# Patient Record
Sex: MALE | Race: WHITE | NOT HISPANIC OR LATINO | Employment: OTHER | URBAN - NONMETROPOLITAN AREA
[De-identification: names, ages, dates, MRNs, and addresses within clinical notes are randomized per-mention and may not be internally consistent; named-entity substitution may affect disease eponyms.]

---

## 2017-05-25 ENCOUNTER — HOSPITAL ENCOUNTER (EMERGENCY)
Facility: HOSPITAL | Age: 47
Discharge: HOME | End: 2017-05-25
Attending: EMERGENCY MEDICINE | Admitting: EMERGENCY MEDICINE

## 2017-05-25 ENCOUNTER — APPOINTMENT (OUTPATIENT)
Dept: GENERAL RADIOLOGY | Facility: HOSPITAL | Age: 47
End: 2017-05-25

## 2017-05-25 VITALS
BODY MASS INDEX: 23.3 KG/M2 | SYSTOLIC BLOOD PRESSURE: 115 MMHG | DIASTOLIC BLOOD PRESSURE: 74 MMHG | OXYGEN SATURATION: 97 % | TEMPERATURE: 98 F | RESPIRATION RATE: 18 BRPM | HEART RATE: 81 BPM | HEIGHT: 66 IN | WEIGHT: 145 LBS

## 2017-05-25 DIAGNOSIS — S43.52XA ACROMIOCLAVICULAR SPRAIN, LEFT, INITIAL ENCOUNTER: Primary | ICD-10-CM

## 2017-05-25 PROCEDURE — 99282 EMERGENCY DEPT VISIT SF MDM: CPT

## 2017-05-25 PROCEDURE — 73030 X-RAY EXAM OF SHOULDER: CPT

## 2017-06-07 ENCOUNTER — OFFICE VISIT (OUTPATIENT)
Dept: ORTHOPEDIC SURGERY | Facility: CLINIC | Age: 47
End: 2017-06-07

## 2017-06-07 VITALS — BODY MASS INDEX: 22.18 KG/M2 | WEIGHT: 138 LBS | HEIGHT: 66 IN

## 2017-06-07 DIAGNOSIS — M25.512 ACUTE PAIN OF LEFT SHOULDER: Primary | ICD-10-CM

## 2017-06-07 DIAGNOSIS — M75.02 ADHESIVE CAPSULITIS OF LEFT SHOULDER: ICD-10-CM

## 2017-06-07 PROCEDURE — 99203 OFFICE O/P NEW LOW 30 MIN: CPT | Performed by: ORTHOPAEDIC SURGERY

## 2017-06-07 RX ORDER — ACETAMINOPHEN 500 MG
500 TABLET ORAL EVERY 6 HOURS PRN
COMMUNITY
End: 2020-07-18

## 2017-06-07 RX ORDER — MELOXICAM 15 MG/1
15 TABLET ORAL DAILY
Qty: 30 TABLET | Refills: 5
Start: 2017-06-07 | End: 2020-07-18

## 2017-06-07 NOTE — PROGRESS NOTES
"Subjective   Jeovany Jacob is a 46 y.o. male. Acute left shoulder pain.     History of Present Illness Patient was seen in the ED for left shoulder pain. Patient states that it started off being sore and then has got worse. The pain is now moderate to severe. He states that he has had  Similar problems with left shoulder  About 10 years ago, and received a steroid injection  And it helped.       The following portions of the patient's history were reviewed and updated as appropriate:   He  has no past medical history on file.  He  does not have any pertinent problems on file.  He  has a past surgical history that includes Appendectomy.  His family history includes No Known Problems in his father and mother.  He  reports that he has been smoking Cigarettes.  He started smoking about 10 years ago. He has been smoking about 10.00 packs per day. He does not have any smokeless tobacco history on file. His alcohol and drug histories are not on file.  Current Outpatient Prescriptions   Medication Sig Dispense Refill   • acetaminophen (TYLENOL) 500 MG tablet Take 500 mg by mouth Every 6 (Six) Hours As Needed for Mild Pain (1-3).     • etodolac (LODINE) 300 MG capsule Take 1 capsule by mouth 3 (Three) Times a Day As Needed (pain). 30 capsule 0     No current facility-administered medications for this visit.      Current Outpatient Prescriptions on File Prior to Visit   Medication Sig   • etodolac (LODINE) 300 MG capsule Take 1 capsule by mouth 3 (Three) Times a Day As Needed (pain).     No current facility-administered medications on file prior to visit.      He has No Known Allergies..    Review of Systems  Ht 66\" (167.6 cm)  Wt 138 lb (62.6 kg)  BMI 22.27 kg/m2    Social History     Social History   • Marital status:      Spouse name: N/A   • Number of children: N/A   • Years of education: N/A     Occupational History   • Not on file.     Social History Main Topics   • Smoking status: Current Every Day Smoker "     Packs/day: 10.00     Types: Cigarettes     Start date: 6/7/2007   • Smokeless tobacco: Not on file   • Alcohol use Not on file   • Drug use: Not on file   • Sexual activity: Not on file     Other Topics Concern   • Not on file     Social History Narrative       HEENT: Normocephalic.  PERRLA.  TM's clear bilaterally.  Oropharynx: Clear.  Neck: Supple, with no adenopathy.  Chest: Equal bilateral expansion.  Clear to auscultation and percussion.  Heart: Regular sinus rhythm, S1 and S2 normal.  No murmurs or extra heart sounds heard.  Abdomen: Soft, nontender, and no organomegaly.  Neurological: cranial nerves II-XII normal Vascular: pulses are present  Dermatological: no rashes  or blemishes, or any abnormality of the skin.    REVIEW OF SYSTEMS:  Negative, other than presenting complaint.  HEENT: No headaches, diplopia, blurred vision, tinnitus, vertigo, epistaxis, hoarseness or sore throat.  Pulmonary: No cough, sputum, hemoptysis, dyspnea, wheezing, or chest pain.  Cardiac: No chest pain, palpitations, orthopnea, paroxysmal nocturnal dyspnea, shortness of breath, or pedal edema.  Gastrointestinal: No diarrhea, melena, or constipation.  Genitourinary: No dysuria, hematuria, nocturia, frequency, bladder or bowel incontinence.  Hematology: No history of any anemia, fatigue, fever, or chills or night sweats.  Dermatology: No rashes, pruritus, or increased pigmentation changes of the skin.   Objective   Physical Exam difficulty in internal rotation , only to posterior superior  Iliac spine  Flexion to 70 degrees external rotation to 45 degrees. Neuro intact.      Assessment/Plan adhesive capsulitis  Of left sholulder. Plan: PT  , meloxicam 15 mg daily with a meal.  See back in 6 weeks.   Jeovany was seen today for pain.    Diagnoses and all orders for this visit:    Acute pain of left shoulder    Adhesive capsulitis of left shoulder

## 2018-01-30 ENCOUNTER — APPOINTMENT (OUTPATIENT)
Dept: ULTRASOUND IMAGING | Facility: HOSPITAL | Age: 48
End: 2018-01-30

## 2018-01-30 ENCOUNTER — HOSPITAL ENCOUNTER (EMERGENCY)
Facility: HOSPITAL | Age: 48
Discharge: HOME | End: 2018-01-30
Attending: EMERGENCY MEDICINE | Admitting: EMERGENCY MEDICINE

## 2018-01-30 VITALS
SYSTOLIC BLOOD PRESSURE: 127 MMHG | OXYGEN SATURATION: 100 % | BODY MASS INDEX: 22.5 KG/M2 | HEIGHT: 66 IN | WEIGHT: 140 LBS | TEMPERATURE: 98.9 F | DIASTOLIC BLOOD PRESSURE: 76 MMHG | HEART RATE: 71 BPM | RESPIRATION RATE: 18 BRPM

## 2018-01-30 DIAGNOSIS — N45.1 EPIDIDYMITIS: Primary | ICD-10-CM

## 2018-01-30 LAB
BILIRUB UR QL STRIP: NEGATIVE
CLARITY UR: CLEAR
COLOR UR: YELLOW
GLUCOSE UR STRIP-MCNC: NEGATIVE MG/DL
HGB UR QL STRIP.AUTO: NEGATIVE
KETONES UR QL STRIP: NEGATIVE
LEUKOCYTE ESTERASE UR QL STRIP.AUTO: NEGATIVE
NITRITE UR QL STRIP: NEGATIVE
PH UR STRIP.AUTO: 6.5 [PH] (ref 5–9)
PROT UR QL STRIP: NEGATIVE
SP GR UR STRIP: 1.02 (ref 1–1.03)
UROBILINOGEN UR QL STRIP: NORMAL

## 2018-01-30 PROCEDURE — 76870 US EXAM SCROTUM: CPT

## 2018-01-30 PROCEDURE — 81003 URINALYSIS AUTO W/O SCOPE: CPT | Performed by: EMERGENCY MEDICINE

## 2018-01-30 PROCEDURE — 96372 THER/PROPH/DIAG INJ SC/IM: CPT

## 2018-01-30 PROCEDURE — 99283 EMERGENCY DEPT VISIT LOW MDM: CPT

## 2018-01-30 PROCEDURE — 93976 VASCULAR STUDY: CPT

## 2018-01-30 PROCEDURE — 25010000002 CEFTRIAXONE PER 250 MG: Performed by: EMERGENCY MEDICINE

## 2018-01-30 RX ORDER — IBUPROFEN 400 MG/1
400 TABLET ORAL ONCE
Status: COMPLETED | OUTPATIENT
Start: 2018-01-30 | End: 2018-01-30

## 2018-01-30 RX ORDER — DOXYCYCLINE 100 MG/1
100 CAPSULE ORAL EVERY 12 HOURS SCHEDULED
Qty: 20 CAPSULE | Refills: 0 | Status: SHIPPED | OUTPATIENT
Start: 2018-01-30 | End: 2020-07-18

## 2018-01-30 RX ORDER — OXYCODONE HYDROCHLORIDE AND ACETAMINOPHEN 5; 325 MG/1; MG/1
1 TABLET ORAL ONCE
Status: COMPLETED | OUTPATIENT
Start: 2018-01-30 | End: 2018-01-30

## 2018-01-30 RX ORDER — NAPROXEN 500 MG/1
500 TABLET ORAL 2 TIMES DAILY WITH MEALS
Qty: 30 TABLET | Refills: 0 | Status: SHIPPED | OUTPATIENT
Start: 2018-01-30 | End: 2020-07-18

## 2018-01-30 RX ADMIN — CEFTRIAXONE SODIUM 250 MG: 250 INJECTION, POWDER, FOR SOLUTION INTRAMUSCULAR; INTRAVENOUS at 20:35

## 2018-01-30 RX ADMIN — OXYCODONE HYDROCHLORIDE AND ACETAMINOPHEN 1 TABLET: 5; 325 TABLET ORAL at 19:26

## 2018-01-30 RX ADMIN — IBUPROFEN 400 MG: 400 TABLET, FILM COATED ORAL at 19:27

## 2018-02-26 ENCOUNTER — HOSPITAL ENCOUNTER (EMERGENCY)
Facility: HOSPITAL | Age: 48
Discharge: LEFT BEFORE DISCHARGE | End: 2018-02-26

## 2018-02-26 VITALS
RESPIRATION RATE: 20 BRPM | TEMPERATURE: 98.7 F | WEIGHT: 140 LBS | SYSTOLIC BLOOD PRESSURE: 122 MMHG | HEART RATE: 85 BPM | DIASTOLIC BLOOD PRESSURE: 84 MMHG | HEIGHT: 67 IN | BODY MASS INDEX: 21.97 KG/M2 | OXYGEN SATURATION: 99 %

## 2018-02-26 PROCEDURE — 99211 OFF/OP EST MAY X REQ PHY/QHP: CPT

## 2018-02-26 NOTE — ED NOTES
Pain & swelling in right testicle starting 2 days ago. Pt came to ED about 3 weeks ago over same issue, but last time it was in his left testicle     Chriss Fischer RN  02/26/18 [id number]

## 2018-05-07 ENCOUNTER — APPOINTMENT (OUTPATIENT)
Dept: CT IMAGING | Facility: HOSPITAL | Age: 48
End: 2018-05-07

## 2018-05-07 ENCOUNTER — HOSPITAL ENCOUNTER (EMERGENCY)
Facility: HOSPITAL | Age: 48
Discharge: HOME | End: 2018-05-07
Attending: EMERGENCY MEDICINE | Admitting: EMERGENCY MEDICINE

## 2018-05-07 VITALS
OXYGEN SATURATION: 99 % | RESPIRATION RATE: 20 BRPM | HEART RATE: 73 BPM | HEIGHT: 66 IN | SYSTOLIC BLOOD PRESSURE: 133 MMHG | WEIGHT: 140 LBS | BODY MASS INDEX: 22.5 KG/M2 | DIASTOLIC BLOOD PRESSURE: 82 MMHG | TEMPERATURE: 98.3 F

## 2018-05-07 DIAGNOSIS — S00.03XA CONTUSION OF RIGHT TEMPOROFRONTAL SCALP, INITIAL ENCOUNTER: ICD-10-CM

## 2018-05-07 DIAGNOSIS — S09.90XA INJURY OF HEAD, INITIAL ENCOUNTER: Primary | ICD-10-CM

## 2018-05-07 PROCEDURE — 70450 CT HEAD/BRAIN W/O DYE: CPT

## 2018-05-07 PROCEDURE — 99283 EMERGENCY DEPT VISIT LOW MDM: CPT

## 2018-05-07 NOTE — ED PROVIDER NOTES
Subjective   Patient presents after injury today at the [facility].  Patient states that he was coming out of the restroom and got tripped up with his feet.  Patient fell 4 striking the right forehead either on a door jamb of the door.  Patient's had hematoma in that area since.  Patient's had dizziness.  Patient feels he had loss of consciousness or at least dizziness after hitting his head.  Patient complains of no other injury at this time.  Patient is ambulatory.  No nausea vomiting.  No neck pain.            Review of Systems   Constitutional: Negative.  Negative for appetite change, chills and fever.   HENT: Negative for congestion.    Eyes: Negative.  Negative for photophobia and visual disturbance.   Respiratory: Negative.  Negative for cough, chest tightness and shortness of breath.    Cardiovascular: Negative.  Negative for chest pain and palpitations.   Gastrointestinal: Negative.  Negative for abdominal pain, constipation, diarrhea, nausea and vomiting.   Endocrine: Negative.    Genitourinary: Negative.  Negative for decreased urine volume, dysuria, flank pain and hematuria.   Musculoskeletal: Negative.  Negative for arthralgias, back pain, myalgias, neck pain and neck stiffness.   Skin: Negative.  Negative for pallor.   Neurological: Positive for headaches. Negative for dizziness, syncope, weakness, light-headedness and numbness.   Psychiatric/Behavioral: Negative.  Negative for confusion and suicidal ideas. The patient is not nervous/anxious.        Past Medical History:   Diagnosis Date   • Seizures        No Known Allergies    Past Surgical History:   Procedure Laterality Date   • APPENDECTOMY         Family History   Problem Relation Age of Onset   • No Known Problems Mother    • No Known Problems Father        Social History     Social History   • Marital status:      Social History Main Topics   • Smoking status: Current Every Day Smoker     Packs/day: 1.00     Years: 5.00     Types:  Cigarettes     Start date: 6/7/2007   • Smokeless tobacco: Current User     Types: Chew   • Alcohol use No   • Drug use: No   • Sexual activity: Defer     Other Topics Concern   • Not on file           Objective   Physical Exam   Constitutional: He is oriented to person, place, and time. He appears well-developed and well-nourished. No distress.   HENT:   There is a 3-4 cm soft tissue swelling of the right forehead.  There is no underlying crepitus or bony step-off noted.   Eyes: Conjunctivae and EOM are normal. Pupils are equal, round, and reactive to light.   Neck: Normal range of motion. Neck supple. No JVD present.   Cardiovascular: Normal rate, regular rhythm, normal heart sounds and intact distal pulses.  Exam reveals no gallop and no friction rub.    No murmur heard.  Pulmonary/Chest: Effort normal. No respiratory distress. He has no wheezes. He has no rales. He exhibits no tenderness.   Abdominal: Soft. Bowel sounds are normal. He exhibits no distension and no mass. There is no tenderness. There is no rebound and no guarding.   Musculoskeletal: Normal range of motion.   Neurological: He is alert and oriented to person, place, and time.   Skin: Skin is warm and dry.   Psychiatric: He has a normal mood and affect. His behavior is normal. Judgment and thought content normal.   Nursing note and vitals reviewed.      Procedures           ED Course  ED Course      Labs Reviewed - No data to display    CT Head Without Contrast   Final Result   No evidence of intracranial hemorrhage, mass effect or large   acute infarct.      Electronically signed by:  Fahad Watkins MD  5/7/2018 3:17 PM CDT   Workstation: [id number]        No acute skull or intracranial injury noted.  Patient with normal mental status.  We'll treat headache and follow up outpatient as needed.            MDM      Final diagnoses:   Injury of head, initial encounter   Contusion of right temporofrontal scalp, initial encounter            Selvin Jaimes,  MD  05/07/18 1524

## 2018-09-25 ENCOUNTER — APPOINTMENT (OUTPATIENT)
Dept: GENERAL RADIOLOGY | Facility: HOSPITAL | Age: 48
End: 2018-09-25

## 2018-09-25 ENCOUNTER — HOSPITAL ENCOUNTER (EMERGENCY)
Facility: HOSPITAL | Age: 48
Discharge: LEFT BEFORE DISCHARGE | End: 2018-09-25
Attending: EMERGENCY MEDICINE | Admitting: EMERGENCY MEDICINE

## 2018-09-25 VITALS
OXYGEN SATURATION: 98 % | BODY MASS INDEX: 22.2 KG/M2 | HEIGHT: 64 IN | RESPIRATION RATE: 18 BRPM | TEMPERATURE: 97.9 F | HEART RATE: 67 BPM | DIASTOLIC BLOOD PRESSURE: 63 MMHG | SYSTOLIC BLOOD PRESSURE: 95 MMHG | WEIGHT: 130 LBS

## 2018-09-25 DIAGNOSIS — R07.9 CHEST PAIN, UNSPECIFIED TYPE: Primary | ICD-10-CM

## 2018-09-25 LAB
ALBUMIN SERPL-MCNC: 3.8 G/DL (ref 3.4–4.8)
ALBUMIN/GLOB SERPL: 1.3 G/DL (ref 1.1–1.8)
ALP SERPL-CCNC: 80 U/L (ref 38–126)
ALT SERPL W P-5'-P-CCNC: 50 U/L (ref 21–72)
ANION GAP SERPL CALCULATED.3IONS-SCNC: 10 MMOL/L (ref 5–15)
AST SERPL-CCNC: 39 U/L (ref 17–59)
BASOPHILS # BLD AUTO: 0.03 10*3/MM3 (ref 0–0.2)
BASOPHILS NFR BLD AUTO: 0.4 % (ref 0–2)
BILIRUB SERPL-MCNC: 0.2 MG/DL (ref 0.2–1.3)
BUN BLD-MCNC: 15 MG/DL (ref 7–21)
BUN/CREAT SERPL: 16 (ref 7–25)
CALCIUM SPEC-SCNC: 8.8 MG/DL (ref 8.4–10.2)
CHLORIDE SERPL-SCNC: 102 MMOL/L (ref 95–110)
CO2 SERPL-SCNC: 28 MMOL/L (ref 22–31)
CREAT BLD-MCNC: 0.94 MG/DL (ref 0.7–1.3)
DEPRECATED RDW RBC AUTO: 45.4 FL (ref 35.1–43.9)
EOSINOPHIL # BLD AUTO: 0.3 10*3/MM3 (ref 0–0.7)
EOSINOPHIL NFR BLD AUTO: 4 % (ref 0–7)
ERYTHROCYTE [DISTWIDTH] IN BLOOD BY AUTOMATED COUNT: 13.4 % (ref 11.5–14.5)
GFR SERPL CREATININE-BSD FRML MDRD: 86 ML/MIN/1.73 (ref 63–147)
GLOBULIN UR ELPH-MCNC: 2.9 GM/DL (ref 2.3–3.5)
GLUCOSE BLD-MCNC: 138 MG/DL (ref 60–100)
HCT VFR BLD AUTO: 38.2 % (ref 39–49)
HGB BLD-MCNC: 12.8 G/DL (ref 13.7–17.3)
HOLD SPECIMEN: NORMAL
HOLD SPECIMEN: NORMAL
IMM GRANULOCYTES # BLD: 0.01 10*3/MM3 (ref 0–0.02)
IMM GRANULOCYTES NFR BLD: 0.1 % (ref 0–0.5)
LYMPHOCYTES # BLD AUTO: 3.38 10*3/MM3 (ref 0.6–4.2)
LYMPHOCYTES NFR BLD AUTO: 44.7 % (ref 10–50)
MCH RBC QN AUTO: 30.9 PG (ref 26.5–34)
MCHC RBC AUTO-ENTMCNC: 33.5 G/DL (ref 31.5–36.3)
MCV RBC AUTO: 92.3 FL (ref 80–98)
MONOCYTES # BLD AUTO: 0.45 10*3/MM3 (ref 0–0.9)
MONOCYTES NFR BLD AUTO: 6 % (ref 0–12)
NEUTROPHILS # BLD AUTO: 3.39 10*3/MM3 (ref 2–8.6)
NEUTROPHILS NFR BLD AUTO: 44.8 % (ref 37–80)
PLATELET # BLD AUTO: 193 10*3/MM3 (ref 150–450)
PMV BLD AUTO: 10.9 FL (ref 8–12)
POTASSIUM BLD-SCNC: 3.4 MMOL/L (ref 3.5–5.1)
PROT SERPL-MCNC: 6.7 G/DL (ref 6.3–8.6)
RBC # BLD AUTO: 4.14 10*6/MM3 (ref 4.37–5.74)
SODIUM BLD-SCNC: 140 MMOL/L (ref 137–145)
TROPONIN I SERPL-MCNC: <0.012 NG/ML
WBC NRBC COR # BLD: 7.56 10*3/MM3 (ref 3.2–9.8)
WHOLE BLOOD HOLD SPECIMEN: NORMAL
WHOLE BLOOD HOLD SPECIMEN: NORMAL

## 2018-09-25 PROCEDURE — 93005 ELECTROCARDIOGRAM TRACING: CPT | Performed by: EMERGENCY MEDICINE

## 2018-09-25 PROCEDURE — 99283 EMERGENCY DEPT VISIT LOW MDM: CPT

## 2018-09-25 PROCEDURE — 85025 COMPLETE CBC W/AUTO DIFF WBC: CPT | Performed by: EMERGENCY MEDICINE

## 2018-09-25 PROCEDURE — 93010 ELECTROCARDIOGRAM REPORT: CPT | Performed by: INTERNAL MEDICINE

## 2018-09-25 PROCEDURE — 71046 X-RAY EXAM CHEST 2 VIEWS: CPT

## 2018-09-25 PROCEDURE — 80053 COMPREHEN METABOLIC PANEL: CPT | Performed by: EMERGENCY MEDICINE

## 2018-09-25 PROCEDURE — 72100 X-RAY EXAM L-S SPINE 2/3 VWS: CPT

## 2018-09-25 PROCEDURE — 84484 ASSAY OF TROPONIN QUANT: CPT | Performed by: EMERGENCY MEDICINE

## 2018-09-25 NOTE — ED PROVIDER NOTES
Subjective   Pt reports he was sitting at his house 3 days ago when gradual left chest pain started. Reports he has been in a lot of stress lately with family and thinks it may be related to anxiety. He is in the ED now because his wife is in same day surgery and thought he should come to the ED for further evaluation on chest pain. Chest pain episodes last approximately 15-30 minutes and reports current chest pain is improving and rates it a 3/10 pain scale. No hx of cardiac problems.  In addition pt reports low back pain started 1 week ago with intermittent urinary hesitancy and says this is similar to previous history of kidney infection.         Chest Pain   Pain location:  L chest  Pain quality: aching and throbbing    Pain radiates to:  Does not radiate  Pain severity:  Mild  Onset quality:  Gradual  Duration:  3 days  Timing:  Intermittent  Relieved by: Getting away from family and being alone.  Worsened by:  Nothing      Review of Systems   Cardiovascular: Positive for chest pain.       Past Medical History:   Diagnosis Date   • Seizures (CMS/HCC)        No Known Allergies    Past Surgical History:   Procedure Laterality Date   • APPENDECTOMY         Family History   Problem Relation Age of Onset   • No Known Problems Mother    • No Known Problems Father        Social History     Social History   • Marital status:      Social History Main Topics   • Smoking status: Current Every Day Smoker     Packs/day: 1.00     Years: 5.00     Types: Cigarettes     Start date: 6/7/2007   • Smokeless tobacco: Current User     Types: Chew   • Alcohol use No   • Drug use: No   • Sexual activity: Defer     Other Topics Concern   • Not on file           Objective   Physical Exam   Constitutional: Vital signs are normal. He appears well-developed and well-nourished.   HENT:   Head: Normocephalic.   Nose: Nose normal.   Eyes: Conjunctivae and lids are normal.   Neck: Trachea normal and full passive range of motion without  pain.   Cardiovascular: Normal rate, regular rhythm, normal heart sounds and normal pulses.    No tenderness on palpation   Pulmonary/Chest: Effort normal and breath sounds normal.   Abdominal: Soft. Normal appearance and bowel sounds are normal.   Musculoskeletal:        Lumbar back: He exhibits tenderness (On palpation (Right lumbar worse than left lumbar)). He exhibits no swelling and no deformity.   Neurological: He is alert.   Skin: Skin is warm and dry.   Psychiatric: He has a normal mood and affect. His speech is normal.   Nursing note and vitals reviewed.      ECG 12 Lead    Date/Time: 9/25/2018 10:30 AM  Performed by: JAQUI CLEMENTE  Authorized by: GORDO MOORE   Interpreted by physician  Comparison: compared with previous ECG from 10/24/2014  Rhythm: sinus rhythm  Rate: normal  BPM: 68  Conduction: conduction normal  ST Segments: ST segments normal  T Waves: T waves normal  Clinical impression: normal ECG                 ED Course      10:15  Offered pt pain meds but pt declines    11:51AM  Lengthy discussion with pt about leaving against medical advice and how second troponin needs to be done for further evaluation on chest pain. Pt expressed he wants to leave AMA because his son has been in a bad accident in [place], [place] and wants to leave the ED immediately. RN will bring pt AMA form. Strongly advised pt to return to ED if symptoms return or worsen.              MDM      Final diagnoses:   Chest pain, unspecified type            Jaqui Clemente PA-C  09/25/18 1548
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
## 2020-07-18 ENCOUNTER — HOSPITAL ENCOUNTER (EMERGENCY)
Facility: HOSPITAL | Age: 50
Discharge: HOME | End: 2020-07-18
Attending: EMERGENCY MEDICINE | Admitting: EMERGENCY MEDICINE

## 2020-07-18 ENCOUNTER — APPOINTMENT (OUTPATIENT)
Dept: CT IMAGING | Facility: HOSPITAL | Age: 50
End: 2020-07-18

## 2020-07-18 VITALS
SYSTOLIC BLOOD PRESSURE: 126 MMHG | DIASTOLIC BLOOD PRESSURE: 84 MMHG | RESPIRATION RATE: 18 BRPM | HEIGHT: 67 IN | WEIGHT: 140 LBS | TEMPERATURE: 98.7 F | BODY MASS INDEX: 21.97 KG/M2 | HEART RATE: 99 BPM | OXYGEN SATURATION: 98 %

## 2020-07-18 DIAGNOSIS — R10.9 RIGHT SIDED ABDOMINAL PAIN: ICD-10-CM

## 2020-07-18 DIAGNOSIS — K63.9 BOWEL WALL THICKENING: Primary | ICD-10-CM

## 2020-07-18 DIAGNOSIS — D72.829 LEUKOCYTOSIS, UNSPECIFIED TYPE: ICD-10-CM

## 2020-07-18 LAB
ALBUMIN SERPL-MCNC: 4.6 G/DL (ref 3.5–5.2)
ALBUMIN/GLOB SERPL: 1.9 G/DL
ALP SERPL-CCNC: 94 U/L (ref 39–117)
ALT SERPL W P-5'-P-CCNC: 13 U/L (ref 1–41)
ANION GAP SERPL CALCULATED.3IONS-SCNC: 10 MMOL/L (ref 5–15)
AST SERPL-CCNC: 18 U/L (ref 1–40)
BASOPHILS # BLD AUTO: 0.07 10*3/MM3 (ref 0–0.2)
BASOPHILS NFR BLD AUTO: 0.4 % (ref 0–1.5)
BILIRUB SERPL-MCNC: 0.4 MG/DL (ref 0–1.2)
BUN SERPL-MCNC: 18 MG/DL (ref 6–20)
BUN/CREAT SERPL: 17.8 (ref 7–25)
CALCIUM SPEC-SCNC: 9.2 MG/DL (ref 8.6–10.5)
CHLORIDE SERPL-SCNC: 102 MMOL/L (ref 98–107)
CO2 SERPL-SCNC: 26 MMOL/L (ref 22–29)
CREAT SERPL-MCNC: 1.01 MG/DL (ref 0.76–1.27)
DEPRECATED RDW RBC AUTO: 42.5 FL (ref 37–54)
EOSINOPHIL # BLD AUTO: 0.13 10*3/MM3 (ref 0–0.4)
EOSINOPHIL NFR BLD AUTO: 0.8 % (ref 0.3–6.2)
ERYTHROCYTE [DISTWIDTH] IN BLOOD BY AUTOMATED COUNT: 12.9 % (ref 12.3–15.4)
GFR SERPL CREATININE-BSD FRML MDRD: 79 ML/MIN/1.73
GLOBULIN UR ELPH-MCNC: 2.4 GM/DL
GLUCOSE SERPL-MCNC: 131 MG/DL (ref 65–99)
HCT VFR BLD AUTO: 39.1 % (ref 37.5–51)
HGB BLD-MCNC: 13.5 G/DL (ref 13–17.7)
HOLD SPECIMEN: NORMAL
IMM GRANULOCYTES # BLD AUTO: 0.05 10*3/MM3 (ref 0–0.05)
IMM GRANULOCYTES NFR BLD AUTO: 0.3 % (ref 0–0.5)
LIPASE SERPL-CCNC: 11 U/L (ref 13–60)
LYMPHOCYTES # BLD AUTO: 2.21 10*3/MM3 (ref 0.7–3.1)
LYMPHOCYTES NFR BLD AUTO: 13.4 % (ref 19.6–45.3)
MCH RBC QN AUTO: 30.8 PG (ref 26.6–33)
MCHC RBC AUTO-ENTMCNC: 34.5 G/DL (ref 31.5–35.7)
MCV RBC AUTO: 89.3 FL (ref 79–97)
MONOCYTES # BLD AUTO: 1.42 10*3/MM3 (ref 0.1–0.9)
MONOCYTES NFR BLD AUTO: 8.6 % (ref 5–12)
NEUTROPHILS NFR BLD AUTO: 12.59 10*3/MM3 (ref 1.7–7)
NEUTROPHILS NFR BLD AUTO: 76.5 % (ref 42.7–76)
NRBC BLD AUTO-RTO: 0 /100 WBC (ref 0–0.2)
PLATELET # BLD AUTO: 231 10*3/MM3 (ref 140–450)
PMV BLD AUTO: 10.1 FL (ref 6–12)
POTASSIUM SERPL-SCNC: 3.8 MMOL/L (ref 3.5–5.2)
PROT SERPL-MCNC: 7 G/DL (ref 6–8.5)
RBC # BLD AUTO: 4.38 10*6/MM3 (ref 4.14–5.8)
SODIUM SERPL-SCNC: 138 MMOL/L (ref 136–145)
WBC # BLD AUTO: 16.47 10*3/MM3 (ref 3.4–10.8)
WHOLE BLOOD HOLD SPECIMEN: NORMAL

## 2020-07-18 PROCEDURE — 74177 CT ABD & PELVIS W/CONTRAST: CPT

## 2020-07-18 PROCEDURE — 25010000002 IOPAMIDOL 61 % SOLUTION: Performed by: EMERGENCY MEDICINE

## 2020-07-18 PROCEDURE — 99284 EMERGENCY DEPT VISIT MOD MDM: CPT

## 2020-07-18 PROCEDURE — 85025 COMPLETE CBC W/AUTO DIFF WBC: CPT | Performed by: EMERGENCY MEDICINE

## 2020-07-18 PROCEDURE — 80053 COMPREHEN METABOLIC PANEL: CPT | Performed by: EMERGENCY MEDICINE

## 2020-07-18 PROCEDURE — 83690 ASSAY OF LIPASE: CPT | Performed by: EMERGENCY MEDICINE

## 2020-07-18 RX ORDER — SODIUM CHLORIDE 0.9 % (FLUSH) 0.9 %
10 SYRINGE (ML) INJECTION AS NEEDED
Status: DISCONTINUED | OUTPATIENT
Start: 2020-07-18 | End: 2020-07-19 | Stop reason: HOSPADM

## 2020-07-18 RX ORDER — AMOXICILLIN AND CLAVULANATE POTASSIUM 875; 125 MG/1; MG/1
1 TABLET, FILM COATED ORAL ONCE
Status: COMPLETED | OUTPATIENT
Start: 2020-07-18 | End: 2020-07-18

## 2020-07-18 RX ORDER — AMOXICILLIN AND CLAVULANATE POTASSIUM 875; 125 MG/1; MG/1
1 TABLET, FILM COATED ORAL 2 TIMES DAILY
Qty: 20 TABLET | Refills: 0 | Status: SHIPPED | OUTPATIENT
Start: 2020-07-18 | End: 2020-07-28

## 2020-07-18 RX ORDER — BUPRENORPHINE HYDROCHLORIDE AND NALOXONE HYDROCHLORIDE DIHYDRATE 8; 2 MG/1; MG/1
1 TABLET SUBLINGUAL DAILY
COMMUNITY
End: 2022-12-16

## 2020-07-18 RX ORDER — AMITRIPTYLINE HYDROCHLORIDE 75 MG/1
75 TABLET, FILM COATED ORAL NIGHTLY
COMMUNITY

## 2020-07-18 RX ADMIN — AMOXICILLIN AND CLAVULANATE POTASSIUM 1 TABLET: 875; 125 TABLET, FILM COATED ORAL at 23:00

## 2020-07-18 RX ADMIN — IOPAMIDOL 80 ML: 612 INJECTION, SOLUTION INTRAVENOUS at 22:13

## 2020-07-19 NOTE — ED PROVIDER NOTES
"Subjective   49-year-old male with reported history of seizures who is on Suboxone presents the emergency department with complaint of right sided abdominal pain.  History of appendectomy.  Reports symptoms started today.  Reports he did not eat much today.  Pain has not improved throughout the day.  Some nausea without vomiting.  Denies fevers or chills.  Ports he feels somewhat bloated and \"backed up\".  Increased belching.  Reports he is passing gas denies diarrhea.    Family history, surgical history, social history, current medications and allergies are reviewed with the patient and triage documentation and vitals are reviewed.      History provided by:  Patient   used: No        Review of Systems   Constitutional: Negative for chills and fever.   HENT: Negative.    Eyes: Negative for photophobia and visual disturbance.   Respiratory: Negative for cough, shortness of breath and wheezing.    Cardiovascular: Negative for chest pain, palpitations and leg swelling.   Gastrointestinal: Positive for abdominal distention, abdominal pain, constipation and nausea. Negative for vomiting.   Endocrine: Negative for polydipsia, polyphagia and polyuria.   Genitourinary: Negative for dysuria, frequency and urgency.   Musculoskeletal: Negative for arthralgias, back pain, myalgias and neck pain.   Skin: Negative for color change, pallor, rash and wound.   Allergic/Immunologic: Negative.    Neurological: Negative.    Hematological: Negative.    Psychiatric/Behavioral: Negative.        Past Medical History:   Diagnosis Date   • Seizures (CMS/HCC)        No Known Allergies    Past Surgical History:   Procedure Laterality Date   • APPENDECTOMY         Family History   Problem Relation Age of Onset   • No Known Problems Mother    • No Known Problems Father        Social History     Socioeconomic History   • Marital status:      Spouse name: Not on file   • Number of children: Not on file   • Years of " education: Not on file   • Highest education level: Not on file   Tobacco Use   • Smoking status: Current Every Day Smoker     Packs/day: 1.00     Years: 5.00     Pack years: 5.00     Types: Cigarettes     Start date: 6/7/2007   • Smokeless tobacco: Current User     Types: Chew   Substance and Sexual Activity   • Alcohol use: No     Frequency: Never   • Drug use: Yes     Types: Methamphetamines   • Sexual activity: Defer           Objective   Physical Exam   Constitutional: He is oriented to person, place, and time. He appears well-developed and well-nourished.  Non-toxic appearance. He does not appear ill. No distress.   HENT:   Head: Normocephalic.   Mouth/Throat: Oropharynx is clear and moist.   Eyes: Pupils are equal, round, and reactive to light.   Cardiovascular: Normal rate, regular rhythm, normal heart sounds and intact distal pulses.   No murmur heard.  Pulmonary/Chest: Effort normal and breath sounds normal. No respiratory distress.   Abdominal: Soft. Normal appearance. Bowel sounds are decreased. There is no hepatosplenomegaly. There is tenderness in the right upper quadrant and right lower quadrant. There is no rigidity, no rebound, no guarding, no tenderness at McBurney's point and negative Austin's sign.   Neurological: He is alert and oriented to person, place, and time.   Skin: Skin is warm and dry. Capillary refill takes less than 2 seconds.   Psychiatric: He has a normal mood and affect. His behavior is normal.   Nursing note and vitals reviewed.      Procedures  none         ED Course      Labs Reviewed   COMPREHENSIVE METABOLIC PANEL - Abnormal; Notable for the following components:       Result Value    Glucose 131 (*)     All other components within normal limits    Narrative:     GFR Normal >60  Chronic Kidney Disease <60  Kidney Failure <15     LIPASE - Abnormal; Notable for the following components:    Lipase 11 (*)     All other components within normal limits   CBC WITH AUTO DIFFERENTIAL -  Abnormal; Notable for the following components:    WBC 16.47 (*)     Neutrophil % 76.5 (*)     Lymphocyte % 13.4 (*)     Neutrophils, Absolute 12.59 (*)     Monocytes, Absolute 1.42 (*)     All other components within normal limits   CBC AND DIFFERENTIAL    Narrative:     The following orders were created for panel order CBC & Differential.  Procedure                               Abnormality         Status                     ---------                               -----------         ------                     CBC Auto Differential[[id number]]        Abnormal            Final result                 Please view results for these tests on the individual orders.   EXTRA TUBES    Narrative:     The following orders were created for panel order Extra Tubes.  Procedure                               Abnormality         Status                     ---------                               -----------         ------                     Light Blue Top[[id number]]                                   Final result               Gold Top - SST[[id number]]                                   Final result                 Please view results for these tests on the individual orders.   LIGHT BLUE TOP   GOLD TOP - SST     Ct Abdomen Pelvis With Contrast    Addendum Date: 7/18/2020 Addendum:    ADDENDUM ADDENDUM #1 THIS REPORT CONTAINS FINDINGS THAT MAY BE CRITICAL TO PATIENT CARE: The findings were verbally discussed via telephone conference with Dr. LETY LUCIO  by Dr. Jenifer Phelps on 7/18/2020 10:47 PM CDT .The results were acknowledged and understood. Electronically signed by:  Traci Phelps MD  7/18/2020 10:47 PM CDT Workstation: [id number]     Result Date: 7/18/2020  Narrative: EXAM:   CT Abdomen and Pelvis With Intravenous Contrast CLINICAL HISTORY:   The patient is 49 years old and is Male; RUQ pain bloating TECHNIQUE:   Axial computed tomography images of the abdomen and pelvis with intravenous contrast.  Sagittal and coronal  reformatted images were created and reviewed.  This CT exam was performed using one or more of the following dose reduction techniques:  automated exposure control, adjustment of the mA and/or kV according to patient size, and/or use of iterative reconstruction technique. COMPARISON:   No relevant prior studies available. FINDINGS:   LUNG BASES:  Unremarkable.  No mass.  No consolidation.  ABDOMEN:   LIVER:  Unremarkable.  No mass.   GALLBLADDER AND BILE DUCTS:  No calcified stones.  No ductal dilation.   PANCREAS:  Mild fatty infiltration of the pancreas is present.   SPLEEN:  A splenule is present within left upper quadrant. The spleen is unremarkable.   ADRENALS:  Unremarkable.  No mass.   KIDNEYS AND URETERS:  Unremarkable. The kidneys enhance symmetrically. No obstructing renal or ureteral calculus is seen. No hydronephrosis or hydroureter. No perinephric fluid or stranding.   STOMACH AND BOWEL:  The stomach is distended with food contents. The small bowel is normal in caliber. Stool is present throughout the colon. Masslike wall thickening involving the cecum is present. There is no bowel obstruction.  PELVIS:   APPENDIX:  The appendix is surgically absent.   BLADDER:  Unremarkable.  No mass.   REPRODUCTIVE:  Unremarkable as visualized.  ABDOMEN and PELVIS:   INTRAPERITONEAL SPACE:  Unremarkable.  No free air.  No significant fluid collection.   BONES/JOINTS:  No acute fracture.   SOFT TISSUES:  The soft tissues are normal.   VASCULATURE:  Unremarkable.  No abdominal aortic aneurysm.   LYMPH NODES:  Unremarkable. No enlarged lymph nodes.     Impression: 1.  Masslike wall thickening involving the cecum. Recommend further evaluation as there is high concern for malignancy. 2. No bowel obstruction. No renal or ureteral calculi. Electronically signed by:  Traci Phelps MD  7/18/2020 10:36 PM CDT Workstation: [id number]          MDM  Number of Diagnoses or Management Options  Bowel wall thickening:   Leukocytosis,  unspecified type:   Right sided abdominal pain:      Amount and/or Complexity of Data Reviewed  Clinical lab tests: reviewed  Tests in the radiology section of CPT®: reviewed  Discuss the patient with other providers: yes    Patient Progress  Patient progress: stable    Laboratory studies unremarkable other than mild leukocytosis of 16.  CT imaging reveals bowel wall thickening of the cecum concerning for possible masslike change.  Spoke with Dr. Moncada on-call for GI who is agreeable to see the patient in follow-up for possible colonoscopy and further evaluation.  Agrees with starting oral antibiotic for leukocytosis and will follow-up by having the patient call the office for an appointment.    Final diagnoses:   Bowel wall thickening   Right sided abdominal pain   Leukocytosis, unspecified type            Messi Combs, DO  07/19/20 0529
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
## 2020-07-19 NOTE — DISCHARGE INSTRUCTIONS
Please return with new or worsening symptoms as discussed. Get antibiotic filled and take for complete course. Call Dr. Moncada Monday for follow-up appointment. Increase Fiber.

## 2020-07-21 ENCOUNTER — OFFICE VISIT (OUTPATIENT)
Dept: GASTROENTEROLOGY | Facility: CLINIC | Age: 50
End: 2020-07-21

## 2020-07-21 VITALS
WEIGHT: 161 LBS | HEART RATE: 92 BPM | BODY MASS INDEX: 25.27 KG/M2 | HEIGHT: 67 IN | SYSTOLIC BLOOD PRESSURE: 110 MMHG | DIASTOLIC BLOOD PRESSURE: 74 MMHG

## 2020-07-21 DIAGNOSIS — R93.5 ABNORMAL CT OF THE ABDOMEN: ICD-10-CM

## 2020-07-21 DIAGNOSIS — R10.31 RIGHT LOWER QUADRANT ABDOMINAL PAIN: Primary | ICD-10-CM

## 2020-07-21 DIAGNOSIS — R10.11 RIGHT UPPER QUADRANT ABDOMINAL PAIN: ICD-10-CM

## 2020-07-21 PROCEDURE — 99204 OFFICE O/P NEW MOD 45 MIN: CPT | Performed by: INTERNAL MEDICINE

## 2020-07-21 RX ORDER — DEXTROSE AND SODIUM CHLORIDE 5; .45 G/100ML; G/100ML
30 INJECTION, SOLUTION INTRAVENOUS CONTINUOUS PRN
Status: CANCELLED | OUTPATIENT
Start: 2020-07-21

## 2020-07-27 ENCOUNTER — LAB (OUTPATIENT)
Dept: LAB | Facility: HOSPITAL | Age: 50
End: 2020-07-27

## 2021-09-28 ENCOUNTER — HOSPITAL ENCOUNTER (EMERGENCY)
Facility: HOSPITAL | Age: 51
Discharge: HOME | End: 2021-09-28
Attending: EMERGENCY MEDICINE | Admitting: EMERGENCY MEDICINE

## 2021-09-28 ENCOUNTER — APPOINTMENT (OUTPATIENT)
Dept: GENERAL RADIOLOGY | Facility: HOSPITAL | Age: 51
End: 2021-09-28

## 2021-09-28 VITALS
HEART RATE: 86 BPM | DIASTOLIC BLOOD PRESSURE: 84 MMHG | TEMPERATURE: 97.8 F | WEIGHT: 130 LBS | RESPIRATION RATE: 17 BRPM | OXYGEN SATURATION: 99 % | SYSTOLIC BLOOD PRESSURE: 132 MMHG | HEIGHT: 67 IN | BODY MASS INDEX: 20.4 KG/M2

## 2021-09-28 DIAGNOSIS — S76.011A HIP STRAIN, RIGHT, INITIAL ENCOUNTER: Primary | ICD-10-CM

## 2021-09-28 PROCEDURE — 96372 THER/PROPH/DIAG INJ SC/IM: CPT

## 2021-09-28 PROCEDURE — 73502 X-RAY EXAM HIP UNI 2-3 VIEWS: CPT

## 2021-09-28 PROCEDURE — 99283 EMERGENCY DEPT VISIT LOW MDM: CPT

## 2021-09-28 PROCEDURE — 25010000002 KETOROLAC TROMETHAMINE PER 15 MG: Performed by: EMERGENCY MEDICINE

## 2021-09-28 RX ORDER — KETOROLAC TROMETHAMINE 30 MG/ML
30 INJECTION, SOLUTION INTRAMUSCULAR; INTRAVENOUS ONCE
Status: COMPLETED | OUTPATIENT
Start: 2021-09-28 | End: 2021-09-28

## 2021-09-28 RX ADMIN — KETOROLAC TROMETHAMINE 30 MG: 30 INJECTION, SOLUTION INTRAMUSCULAR; INTRAVENOUS at 11:03

## 2022-12-06 ENCOUNTER — APPOINTMENT (OUTPATIENT)
Dept: GENERAL RADIOLOGY | Facility: HOSPITAL | Age: 52
End: 2022-12-06

## 2022-12-06 ENCOUNTER — HOSPITAL ENCOUNTER (EMERGENCY)
Facility: HOSPITAL | Age: 52
Discharge: HOME | End: 2022-12-06
Attending: EMERGENCY MEDICINE | Admitting: EMERGENCY MEDICINE

## 2022-12-06 VITALS
HEIGHT: 67 IN | HEART RATE: 95 BPM | TEMPERATURE: 97.7 F | WEIGHT: 150 LBS | RESPIRATION RATE: 20 BRPM | DIASTOLIC BLOOD PRESSURE: 72 MMHG | BODY MASS INDEX: 23.54 KG/M2 | SYSTOLIC BLOOD PRESSURE: 121 MMHG | OXYGEN SATURATION: 99 %

## 2022-12-06 DIAGNOSIS — M79.671 FOOT PAIN, RIGHT: Primary | ICD-10-CM

## 2022-12-06 PROCEDURE — 99283 EMERGENCY DEPT VISIT LOW MDM: CPT

## 2022-12-06 PROCEDURE — 73610 X-RAY EXAM OF ANKLE: CPT

## 2022-12-06 PROCEDURE — 73630 X-RAY EXAM OF FOOT: CPT

## 2022-12-06 RX ORDER — IBUPROFEN 600 MG/1
600 TABLET ORAL EVERY 8 HOURS PRN
Qty: 21 TABLET | Refills: 0 | Status: SHIPPED | OUTPATIENT
Start: 2022-12-06

## 2022-12-06 RX ORDER — HYDROCODONE BITARTRATE AND ACETAMINOPHEN 5; 325 MG/1; MG/1
1 TABLET ORAL ONCE
Status: COMPLETED | OUTPATIENT
Start: 2022-12-06 | End: 2022-12-06

## 2022-12-06 RX ORDER — IBUPROFEN 400 MG/1
400 TABLET ORAL ONCE
Status: COMPLETED | OUTPATIENT
Start: 2022-12-06 | End: 2022-12-06

## 2022-12-06 RX ADMIN — HYDROCODONE BITARTRATE AND ACETAMINOPHEN 1 TABLET: 5; 325 TABLET ORAL at 20:04

## 2022-12-06 RX ADMIN — IBUPROFEN 400 MG: 400 TABLET, FILM COATED ORAL at 20:04

## 2022-12-07 NOTE — ED PROVIDER NOTES
Subjective     Foot Pain  Location:  Right foot  Quality:  Aching  Severity:  Moderate  Onset quality:  Sudden  Duration:  1 day  Timing:  Constant  Progression:  Unchanged  Chronicity:  New  Context:  The patient fell about 4 feet off of a ladder landing on his feet most of the weight displaced on the right foot.  Has had pain ever since.  Relieved by:  Nothing  Worsened by:  Weightbearing  Ineffective treatments:  Rest  Associated symptoms: no abdominal pain, no chest pain, no diarrhea, no fever, no loss of consciousness, no rash, no shortness of breath and no vomiting        Review of Systems   Constitutional: Negative for fever.   Respiratory: Negative for shortness of breath.    Cardiovascular: Negative for chest pain.   Gastrointestinal: Negative for abdominal pain, diarrhea and vomiting.   Skin: Negative for rash.   Neurological: Negative for loss of consciousness.   All other systems reviewed and are negative.      Past Medical History:   Diagnosis Date   • Seizures (HCC)        No Known Allergies    Past Surgical History:   Procedure Laterality Date   • APPENDECTOMY         Family History   Problem Relation Age of Onset   • No Known Problems Mother    • No Known Problems Father        Social History     Socioeconomic History   • Marital status:    Tobacco Use   • Smoking status: Every Day     Packs/day: 1.00     Years: 5.00     Pack years: 5.00     Types: Cigarettes     Start date: 6/7/2007   • Smokeless tobacco: Current     Types: Chew   Substance and Sexual Activity   • Alcohol use: No   • Drug use: Yes     Types: Methamphetamines   • Sexual activity: Defer           Objective   Physical Exam  Vitals and nursing note reviewed.   Constitutional:       Appearance: He is not ill-appearing.   HENT:      Head: Normocephalic and atraumatic.      Right Ear: External ear normal.      Left Ear: External ear normal.   Eyes:      General: No scleral icterus.  Cardiovascular:      Rate and Rhythm: Normal rate  and regular rhythm.   Pulmonary:      Effort: Pulmonary effort is normal.      Breath sounds: Normal breath sounds.   Abdominal:      General: Abdomen is flat.      Tenderness: There is no abdominal tenderness.   Musculoskeletal:         General: No signs of injury.      Comments: Diffuse pain to the right foot.  Embryologic based deformities of the distal extremities x4.   Skin:     General: Skin is warm and dry.   Neurological:      Mental Status: He is alert and oriented to person, place, and time.   Psychiatric:         Behavior: Behavior normal.         Procedures           ED Course                                           MDM  Number of Diagnoses or Management Options     Amount and/or Complexity of Data Reviewed  Tests in the radiology section of CPT®: reviewed  Decide to obtain previous medical records or to obtain history from someone other than the patient: yes  Obtain history from someone other than the patient: yes  Review and summarize past medical records: yes        Final diagnoses:   Foot pain, right       ED Disposition  ED Disposition     ED Disposition   Discharge    Condition   Stable    Comment   --             [facility] EMERGENCY DEPARTMENT  [address] [place] [id number] [phone]    As needed, If symptoms worsen at any time         Medication List      New Prescriptions    ibuprofen 600 MG tablet  Commonly known as: ADVIL,MOTRIN  Take 1 tablet by mouth Every 8 (Eight) Hours As Needed for Mild Pain.           Where to Get Your Medications      These medications were sent to [id number] - [place], [place] - [address] [facility] [address] - [phone]  - [phone]   [address] [address]    Phone: [phone]   · ibuprofen 600 MG tablet          Marino Carl DO  12/07/22 0004

## 2022-12-16 ENCOUNTER — OFFICE VISIT (OUTPATIENT)
Dept: PODIATRY | Facility: CLINIC | Age: 52
End: 2022-12-16

## 2022-12-16 VITALS — OXYGEN SATURATION: 97 % | BODY MASS INDEX: 23.54 KG/M2 | WEIGHT: 150 LBS | HEART RATE: 84 BPM | HEIGHT: 67 IN

## 2022-12-16 DIAGNOSIS — M79.671 ACUTE FOOT PAIN, RIGHT: ICD-10-CM

## 2022-12-16 DIAGNOSIS — M79.671 PAIN OF RIGHT HEEL: ICD-10-CM
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
## 2022-12-16 DIAGNOSIS — S92.011A CLOSED DISPLACED FRACTURE OF BODY OF RIGHT CALCANEUS, INITIAL ENCOUNTER: Primary | ICD-10-CM

## 2022-12-16 PROCEDURE — 29515 APPLICATION SHORT LEG SPLINT: CPT | Performed by: NURSE PRACTITIONER

## 2022-12-16 PROCEDURE — 99204 OFFICE O/P NEW MOD 45 MIN: CPT | Performed by: NURSE PRACTITIONER

## 2022-12-16 RX ORDER — HYDROCODONE BITARTRATE AND ACETAMINOPHEN 7.5; 325 MG/1; MG/1
1 TABLET ORAL EVERY 6 HOURS PRN
Qty: 40 TABLET | Refills: 0 | Status: SHIPPED | OUTPATIENT
Start: 2022-12-16 | End: 2022-12-16 | Stop reason: SDUPTHER

## 2022-12-16 RX ORDER — HYDROCODONE BITARTRATE AND ACETAMINOPHEN 7.5; 325 MG/1; MG/1
1 TABLET ORAL EVERY 6 HOURS PRN
Qty: 28 TABLET | Refills: 0 | Status: SHIPPED | OUTPATIENT
Start: 2022-12-16 | End: 2022-12-23

## 2022-12-16 NOTE — PROGRESS NOTES
Jeovany Jacob  1970  52 y.o. male      12/16/2022    Chief Complaint   Patient presents with   • Right Foot - Pain       History of Present Illness    Jeovany Jacob is a 52 y.o.male presents to the clinic today with a right foot injury.Patient was on a ladder and fell off, landed on both feet and cannot put weight on his right heel now. Pain is a 7. Xray's on 12/06/2022.     52-year-old  male in the office today for follow-up evaluation from the emergency department with an injury to the right foot which occurred on December 6, 2022 after he fell approximately 4 to 5 feet straight to the ground landing on the heel of his foot.  Since then he has been unable to bear weight and complains of significant significant pain limited motion swelling and hypersensitivity.  He was seen in the emergency department where they wrapped it in an Ace wrap and given a set of crutches to modify as weightbearing and to follow-up with us.    Past Medical History:   Diagnosis Date   • Seizures (HCC)          Past Surgical History:   Procedure Laterality Date   • APPENDECTOMY           Family History   Problem Relation Age of Onset   • No Known Problems Mother    • No Known Problems Father        No Known Allergies    Social History     Socioeconomic History   • Marital status:    Tobacco Use   • Smoking status: Every Day     Packs/day: 1.00     Years: 5.00     Pack years: 5.00     Types: Cigarettes     Start date: 6/7/2007   • Smokeless tobacco: Current     Types: Chew   Substance and Sexual Activity   • Alcohol use: No   • Drug use: Yes     Types: Methamphetamines   • Sexual activity: Defer         Current Outpatient Medications   Medication Sig Dispense Refill   • HYDROcodone-acetaminophen (NORCO) 7.5-325 MG per tablet Take 1 tablet by mouth Every 6 (Six) Hours As Needed for Moderate Pain for up to 7 days. 28 tablet 0   • ibuprofen (ADVIL,MOTRIN) 600 MG tablet Take 1 tablet by mouth Every 8 (Eight) Hours As  "Needed for Mild Pain. 21 tablet 0   • amitriptyline (ELAVIL) 75 MG tablet Take 75 mg by mouth Every Night.     • diclofenac (VOLTAREN) 50 MG EC tablet Take 1 tablet by mouth 3 (Three) Times a Day. 30 tablet 0   • polyethylene glycol (GoLYTELY) 236 g solution Starting at noon on day prior to procedure, drink 8 ounces every 30 minutes until all gone or stools are clear. May add flavor packet. 4000 mL 0     No current facility-administered medications for this visit.       Review of Systems   Constitutional: Negative.    HENT: Negative.    Eyes: Negative.    Respiratory: Negative.    Cardiovascular: Negative.    Gastrointestinal: Negative.    Endocrine: Negative.    Genitourinary: Negative.    Musculoskeletal:        Foot pain     Skin: Negative.    Allergic/Immunologic: Negative.    Neurological: Negative.    Hematological: Negative.    Psychiatric/Behavioral: Negative.          OBJECTIVE    Pulse 84   Ht 170.2 cm (67\")   Wt 68 kg (150 lb)   SpO2 97%   BMI 23.49 kg/m²     Physical Exam  Vitals reviewed.   Constitutional:       Appearance: Normal appearance. He is well-developed.   HENT:      Head: Normocephalic and atraumatic.   Neck:      Trachea: Trachea and phonation normal.   Pulmonary:      Effort: Pulmonary effort is normal. No respiratory distress.   Abdominal:      General: There is no distension.      Palpations: Abdomen is soft.   Musculoskeletal:      Right ankle: Tenderness present over the lateral malleolus and medial malleolus. Decreased range of motion.      Right Achilles Tendon: Normal.      Left ankle: Normal.      Right foot: Decreased range of motion. Normal capillary refill. Swelling, tenderness (Hindfoot particularly in the calcaneus) and bony tenderness present. Normal pulse.   Skin:     General: Skin is warm and dry.   Neurological:      Mental Status: He is alert and oriented to person, place, and time.      GCS: GCS eye subscore is 4. GCS verbal subscore is 5. GCS motor subscore is 6. "   Psychiatric:         Speech: Speech normal.         Behavior: Behavior normal. Behavior is cooperative.         Thought Content: Thought content normal.         Judgment: Judgment normal.                Procedures        ASSESSMENT AND PLAN    Diagnoses and all orders for this visit:    1. Closed displaced fracture of body of right calcaneus, initial encounter (Primary)    2. Pain of right heel  -     XR calcaneUS 2+ vw right; Future  -     CT FOOT RIGHT WITHOUT CONTRAST; Future  -     CT ANKLE RIGHT WITHOUT CONTRAST; Future  -     Discontinue: HYDROcodone-acetaminophen (NORCO) 7.5-325 MG per tablet; Take 1 tablet by mouth Every 6 (Six) Hours As Needed for Moderate Pain for up to 10 days.  Dispense: 40 tablet; Refill: 0  -     HYDROcodone-acetaminophen (NORCO) 7.5-325 MG per tablet; Take 1 tablet by mouth Every 6 (Six) Hours As Needed for Moderate Pain for up to 7 days.  Dispense: 28 tablet; Refill: 0    3. Acute foot pain, right    4. Fall from ladder, initial encounter        Reviewed x-ray films today in office with the patient and his mother and after long discussion I suspect that the patient has a calcaneus fracture.  After obtaining the dedicated films for the calcaneus there is a notable displaced fracture of the calcaneal body noted.  We reviewed these x-rays as well and I recommended obtaining a CT scan of the foot and ankle for further evaluation of the fracture and of the ankle pain.  Until then he was placed in a Ortho boot told the weight-bear for balance only using crutches and/or walker and a prescription for hydrocodone was sent in for him and he was instructed to discontinue his Suboxone while taking these.  We had a long discussion about the risk benefits and treatment options of opioid pain medication and he verbalized understanding of them.  I told him that we would be titrating the dose of narcotics as we went through this process and eventually weaning him off allowing him to go back on the  Suboxone as previously taken.  He was in agreement as well as his mother and they left the office via wheelchair.  They were instructed to contact the office for any worsening symptoms.          This document has been electronically signed by JANELL Davies, ONP-C on December 22, 2022 13:42 CST

## 2022-12-30 ENCOUNTER — HOSPITAL ENCOUNTER (OUTPATIENT)
Dept: CT IMAGING | Facility: HOSPITAL | Age: 52
Discharge: HOME | End: 2022-12-30
Admitting: NURSE PRACTITIONER

## 2022-12-30 DIAGNOSIS — M79.671 PAIN OF RIGHT HEEL: ICD-10-CM

## 2022-12-30 PROCEDURE — 73700 CT LOWER EXTREMITY W/O DYE: CPT

## 2023-01-05 ENCOUNTER — OFFICE VISIT (OUTPATIENT)
Dept: PODIATRY | Facility: CLINIC | Age: 53
End: 2023-01-05
Payer: COMMERCIAL

## 2023-01-05 VITALS — HEIGHT: 67 IN | WEIGHT: 150 LBS | OXYGEN SATURATION: 100 % | HEART RATE: 100 BPM | BODY MASS INDEX: 23.54 KG/M2
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
## 2023-01-05 DIAGNOSIS — S92.024D CLOSED NONDISPLACED FRACTURE OF ANTERIOR PROCESS OF RIGHT CALCANEUS WITH ROUTINE HEALING, SUBSEQUENT ENCOUNTER: Primary | ICD-10-CM

## 2023-01-05 DIAGNOSIS — M79.671 PAIN OF RIGHT HEEL: ICD-10-CM

## 2023-01-05 PROCEDURE — 99213 OFFICE O/P EST LOW 20 MIN: CPT | Performed by: NURSE PRACTITIONER

## 2023-01-05 PROCEDURE — 28400 CLTX CALCANEAL FX W/O MNPJ: CPT | Performed by: NURSE PRACTITIONER

## 2023-01-05 RX ORDER — HYDROCODONE BITARTRATE AND ACETAMINOPHEN 7.5; 325 MG/1; MG/1
1 TABLET ORAL EVERY 6 HOURS PRN
COMMUNITY
End: 2023-01-15 | Stop reason: SDUPTHER

## 2023-01-05 NOTE — PROGRESS NOTES
Jeovany Jacob  1970  52 y.o. male    1/5/2022    Chief Complaint   Patient presents with   • Right Foot - Results       History of Present Illness    Jeovany Jacob is a 52 y.o.male patient returns to clinic for CT scan results. Patient is non WB in short cam boot and crutches. Patient states his pain is 6/10. Patient fell off ladder on December 6,2022.       Past Medical History:   Diagnosis Date   • Seizures (HCC)          Past Surgical History:   Procedure Laterality Date   • APPENDECTOMY           Family History   Problem Relation Age of Onset   • No Known Problems Mother    • No Known Problems Father        No Known Allergies    Social History     Socioeconomic History   • Marital status:    Tobacco Use   • Smoking status: Every Day     Packs/day: 1.00     Years: 5.00     Pack years: 5.00     Types: Cigarettes     Start date: 6/7/2007   • Smokeless tobacco: Current     Types: Chew   Substance and Sexual Activity   • Alcohol use: No   • Drug use: Yes     Types: Methamphetamines   • Sexual activity: Defer         Current Outpatient Medications   Medication Sig Dispense Refill   • HYDROcodone-acetaminophen (NORCO) 7.5-325 MG per tablet Take 1 tablet by mouth Every 6 (Six) Hours As Needed for Moderate Pain.     • ibuprofen (ADVIL,MOTRIN) 600 MG tablet Take 1 tablet by mouth Every 8 (Eight) Hours As Needed for Mild Pain. 21 tablet 0   • amitriptyline (ELAVIL) 75 MG tablet Take 75 mg by mouth Every Night.     • diclofenac (VOLTAREN) 50 MG EC tablet Take 1 tablet by mouth 3 (Three) Times a Day. 30 tablet 0   • polyethylene glycol (GoLYTELY) 236 g solution Starting at noon on day prior to procedure, drink 8 ounces every 30 minutes until all gone or stools are clear. May add flavor packet. 4000 mL 0     No current facility-administered medications for this visit.       Review of Systems   Constitutional: Negative.    HENT: Negative.    Eyes: Negative.    Respiratory: Negative.    Cardiovascular:  Negative.    Gastrointestinal: Negative.    Endocrine: Negative.    Genitourinary: Negative.    Musculoskeletal:        Foot pain     Skin: Negative.    Allergic/Immunologic: Negative.    Neurological: Negative.    Hematological: Negative.    Psychiatric/Behavioral: Negative.          OBJECTIVE    Pulse 100   Ht 170.2 cm (67\")   Wt 68 kg (150 lb)   SpO2 100%   BMI 23.49 kg/m²     Physical Exam  Vitals reviewed.   Constitutional:       Appearance: Normal appearance. He is well-developed.   HENT:      Head: Normocephalic and atraumatic.   Neck:      Trachea: Trachea and phonation normal.   Pulmonary:      Effort: Pulmonary effort is normal. No respiratory distress.   Abdominal:      General: There is no distension.      Palpations: Abdomen is soft.   Musculoskeletal:      Right ankle: Tenderness present over the lateral malleolus and medial malleolus. Decreased range of motion.      Right Achilles Tendon: Normal.      Left ankle: Normal.      Right foot: Decreased range of motion. Normal capillary refill. Swelling, tenderness (Hindfoot particularly in the calcaneus) and bony tenderness present. Normal pulse.   Skin:     General: Skin is warm and dry.   Neurological:      Mental Status: He is alert and oriented to person, place, and time.      GCS: GCS eye subscore is 4. GCS verbal subscore is 5. GCS motor subscore is 6.   Psychiatric:         Speech: Speech normal.         Behavior: Behavior normal. Behavior is cooperative.         Thought Content: Thought content normal.         Judgment: Judgment normal.          XR Ankle 3+ View Right    Result Date: 12/6/2022  Narrative: Three view right ankle and foot HISTORY: Heel pain after falling 3 feet. AP, lateral and oblique views of the right ankle and foot obtained. COMPARISON: None FINDINGS: No fracture or dislocation. Small calcaneal spur at insertion of the Achilles tendon. 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 No other osseous or articular abnormality. 4.5 mm wire fragment in the soft tissues lateral to the distal talus. Additional small wire fragment overlying the soft tissues of the heel.     Impression: CONCLUSION: No fracture or dislocation. Small calcaneal spur at insertion of the Achilles tendon. 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 4.5 mm wire fragment in the soft tissues lateral to the distal talus. [id number] Electronically signed by:  Ady Alcala MD  12/6/2022 5:23 PM CST Workstation: [id number]    XR Foot 3+ View Right    Result Date: 12/6/2022  Narrative: Three view right ankle and foot HISTORY: Heel pain after falling 3 feet. AP, lateral and oblique views of the right ankle and foot obtained. COMPARISON: None FINDINGS: No fracture or dislocation. Small calcaneal spur at insertion of the Achilles tendon. 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 No other osseous or articular abnormality. 4.5 mm wire fragment in the soft tissues lateral to the distal talus. Additional small wire fragment overlying the soft tissues of the heel.     Impression: CONCLUSION: No fracture or dislocation. Small calcaneal spur at insertion of the Achilles tendon. 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 4.5 mm wire fragment in the soft tissues lateral to the distal talus. [id number] Electronically signed by:  Ady Alcala MD  12/6/2022 5:23 PM CST Workstation: [id number]    XR calcaneUS 2+ vw right    Result Date: 12/20/2022  Narrative: PROCEDURE: XR CALCANEUS 2+ VW RIGHT VIEWS: 2 INDICATION: Pain COMPARISON: 6/6/2022 FINDINGS:    - fracture: Longitudinally oriented calcaneal fracture. This is best seen on the axial view at the medial cortex, and is not well visualized on the lateral view   - alignment: Minimal displacement at fracture site.   - misc: No significant soft tissue abnormality     Impression: Mildly displaced calcaneal fracture as above Electronically signed by:  Kellie Polo MD  12/20/2022 2:00 PM CST Workstation: [id number]    CT ankle right wo contrast    Result Date: 1/2/2023  Narrative: PROCEDURE: CT ANKLE WITHOUT IV CONTRAST RIGHT HISTORY:  Limping, acute, ankle pain (Ped 0-5y) , M79.671 Pain in right foot: Noted calcaneus fracture This exam was performed using radiation doses that are as low as reasonably achievable (ALARA).This exam was performed according to our departmental dose optimization program, which includes automated exposure control, adjustment of the mA and/or KV according to patient size and/or use of iterative reconstruction technique. COMPARISON: Right ankle radiographs dated December 16, 2010 TECHNIQUE: Axial images were obtained and multiplanar reconstructions were made.  FINDINGS: Bones: Nondisplaced calcaneal fracture with both horizontal and vertical components involving the calcaneal body. Vertical component extends to the lateral posterior aspect of the subtalar joint without articular surface step-off. Fracture line extends anteriorly to involve the calcaneocuboid articulation without significant displacement. No visualized navicular or cuboid fracture. Cuneiforms are intact. First tarsometatarsal joint space narrowing with small subchondral cyst in the medial cuneiform. First MTP joint space narrowing. Lisfranc joint is maintained. Tibiotalar joint is normal. No tibial fracture. Lateral malleolus is intact. Soft tissues: Soft tissue swelling about the ankle Other: None     Impression: Nondisplaced calcaneal fracture with minimal involvement of the posterior lateral subtalar and calcaneocuboid  joints without articular surface disruption. Electronically signed by:  Trav Melton DO  1/2/2023 9:46 AM CST Workstation: [id number]          Procedures        ASSESSMENT AND PLAN    Diagnoses and all orders for this visit:    1. Closed nondisplaced fracture of anterior process of right calcaneus with routine healing, subsequent encounter (Primary)    2. Fall from ladder, initial encounter    3. Pain of right heel        Reviewed the CT films with Dr. De Leon and at this point we will not recommend any surgical intervention for the fracture of the calcaneus.  We will continue use of the Ortho boot, modified weightbearing with crutches and follow-up in 4 weeks for recheck and repeat x-rays of the calcaneus prior to the office visit.   Contact office for any worsening symptoms         This document has been electronically signed by Roge SHEPHERD, FNP-C, ONP-C on January 5, 2023 15:30 CST

## 2023-01-13 ENCOUNTER — TELEPHONE (OUTPATIENT)
Dept: PODIATRY | Facility: CLINIC | Age: 53
End: 2023-01-13
Payer: COMMERCIAL

## 2023-01-13 DIAGNOSIS — S92.011A CLOSED DISPLACED FRACTURE OF BODY OF RIGHT CALCANEUS, INITIAL ENCOUNTER: Primary | ICD-10-CM

## 2023-01-13 NOTE — TELEPHONE ENCOUNTER
PT REQUESTS A CALL BACK RE WANTS TO KNOW WHAT HE HAS TO DO TO GET PAIN MEDS CALLED IN TO [facility].    PT SAID HE WAS TOLD LAST VISIT THAT IF HE NEEDED ANY PAIN MEDS TO LET US KNOW.  CALL BACK # [phone].  THANK YOU.

## 2023-01-15 RX ORDER — HYDROCODONE BITARTRATE AND ACETAMINOPHEN 7.5; 325 MG/1; MG/1
1 TABLET ORAL EVERY 8 HOURS PRN
Qty: 30 TABLET | Refills: 0 | Status: SHIPPED | OUTPATIENT
Start: 2023-01-15 | End: 2023-01-15 | Stop reason: SDUPTHER

## 2023-01-15 RX ORDER — HYDROCODONE BITARTRATE AND ACETAMINOPHEN 7.5; 325 MG/1; MG/1
1 TABLET ORAL EVERY 8 HOURS PRN
Qty: 30 TABLET | Refills: 0 | Status: SHIPPED | OUTPATIENT
Start: 2023-01-15

## 2023-01-31 NOTE — ED NOTES
"Pt was walking out of the [facility] and tripped over the door frame.  He states he tried to catch himself but he hit his head on the concrete. Pt states \"everything went black for a few seconds and when I came to, a lady was talking to me\".  Pt reports a headache but no other injuries at this time.     Jessica Cowart RN  05/07/18 [id number]    " [None] : none [Mass ___ cm] : no masses on the base of the tongue [Normal] : normal [Lesion(s)] : no lesions

## 2023-02-02 ENCOUNTER — OFFICE VISIT (OUTPATIENT)
Dept: PODIATRY | Facility: CLINIC | Age: 53
End: 2023-02-02
Payer: COMMERCIAL

## 2023-02-02 VITALS — HEIGHT: 67 IN | WEIGHT: 150 LBS | BODY MASS INDEX: 23.54 KG/M2

## 2023-02-02 DIAGNOSIS — S92.014D CLOSED NONDISPLACED FRACTURE OF BODY OF RIGHT CALCANEUS WITH ROUTINE HEALING, SUBSEQUENT ENCOUNTER: ICD-10-CM

## 2023-02-02 DIAGNOSIS — S92.011A CLOSED DISPLACED FRACTURE OF BODY OF RIGHT CALCANEUS, INITIAL ENCOUNTER: Primary | ICD-10-CM

## 2023-02-02 PROCEDURE — 99024 POSTOP FOLLOW-UP VISIT: CPT | Performed by: NURSE PRACTITIONER

## 2023-02-02 NOTE — PROGRESS NOTES
Jeovany Jacob  1970  52 y.o. male    02/02/2023      Chief Complaint   Patient presents with   • Right Foot - Pain, Follow-up       History of Present Illness    Jeovany Jacob is a 52 y.o.male patient returns to clinic for right foot pain.      Past Medical History:   Diagnosis Date   • Seizures (HCC)          Past Surgical History:   Procedure Laterality Date   • APPENDECTOMY           Family History   Problem Relation Age of Onset   • No Known Problems Mother    • No Known Problems Father        No Known Allergies    Social History     Socioeconomic History   • Marital status:    Tobacco Use   • Smoking status: Every Day     Packs/day: 1.00     Years: 5.00     Pack years: 5.00     Types: Cigarettes     Start date: 6/7/2007   • Smokeless tobacco: Current     Types: Chew   Substance and Sexual Activity   • Alcohol use: No   • Drug use: Yes     Types: Methamphetamines   • Sexual activity: Defer         Current Outpatient Medications   Medication Sig Dispense Refill   • HYDROcodone-acetaminophen (NORCO) 7.5-325 MG per tablet Take 1 tablet by mouth Every 8 (Eight) Hours As Needed for Moderate Pain. 30 tablet 0   • ibuprofen (ADVIL,MOTRIN) 600 MG tablet Take 1 tablet by mouth Every 8 (Eight) Hours As Needed for Mild Pain. 21 tablet 0   • amitriptyline (ELAVIL) 75 MG tablet Take 75 mg by mouth Every Night.     • diclofenac (VOLTAREN) 50 MG EC tablet Take 1 tablet by mouth 3 (Three) Times a Day. 30 tablet 0   • polyethylene glycol (GoLYTELY) 236 g solution Starting at noon on day prior to procedure, drink 8 ounces every 30 minutes until all gone or stools are clear. May add flavor packet. 4000 mL 0     No current facility-administered medications for this visit.       Review of Systems   Constitutional: Negative.    HENT: Negative.    Eyes: Negative.    Respiratory: Negative.    Cardiovascular: Negative.    Gastrointestinal: Negative.    Endocrine: Negative.    Genitourinary: Negative.   "  Musculoskeletal:        Foot pain     Skin: Negative.    Allergic/Immunologic: Negative.    Neurological: Negative.    Hematological: Negative.    Psychiatric/Behavioral: Negative.          OBJECTIVE    Ht 170.2 cm (67\")   Wt 68 kg (150 lb)   BMI 23.49 kg/m²     Physical Exam  Vitals reviewed.   Constitutional:       Appearance: Normal appearance. He is well-developed.   HENT:      Head: Normocephalic and atraumatic.   Neck:      Trachea: Trachea and phonation normal.   Pulmonary:      Effort: Pulmonary effort is normal. No respiratory distress.   Abdominal:      General: There is no distension.      Palpations: Abdomen is soft.   Musculoskeletal:      Right ankle: Tenderness present over the lateral malleolus and medial malleolus. Decreased range of motion.      Right Achilles Tendon: Normal.      Left ankle: Normal.      Right foot: Normal range of motion and normal capillary refill. Swelling, tenderness (Hindfoot particularly in the calcaneus) and bony tenderness present. Normal pulse.      Left foot: Normal range of motion.   Feet:      Right foot:      Skin integrity: Skin integrity normal.      Toenail Condition: Right toenails are normal.      Left foot:      Skin integrity: Skin integrity normal.      Toenail Condition: Left toenails are normal.   Skin:     General: Skin is warm and dry.   Neurological:      Mental Status: He is alert and oriented to person, place, and time.      GCS: GCS eye subscore is 4. GCS verbal subscore is 5. GCS motor subscore is 6.   Psychiatric:         Speech: Speech normal.         Behavior: Behavior normal. Behavior is cooperative.         Thought Content: Thought content normal.         Judgment: Judgment normal.          XR Calcaneus 2+ View Right    Result Date: 2/2/2023  Narrative: Two view right calcaneus HISTORY: Follow-up calcaneal fracture. Pain. Weight bearing calcaneal and lateral views obtained. COMPARISON: December 16, 2022 FINDINGS: Healing essentially " nondisplaced calcaneal fracture. No dislocation. Very small calcaneal spur at insertion of Achilles tendon. No other osseous or articular abnormality.     Impression: CONCLUSION: Healing essentially nondisplaced calcaneal fracture. [id number] Electronically signed by:  Ady Alcala MD  2/2/2023 6:12 PM CST Workstation: [id number]            Procedures        ASSESSMENT AND PLAN    Diagnoses and all orders for this visit:    1. Closed nondisplaced fracture of body of right calcaneus with routine healing, subsequent encounter  -     XR Calcaneus 2+ View Right    Reviewed x-rays today which reveals a healing fracture of the calcaneus with no other acute radiological abnormality noted.  Patient reports his pain has significantly improved and at this point he is off his pain medication and ambulating in a regular shoe without difficulty.  We will ask him to follow-up in 4 to 6 weeks for repeat x-rays of the calcaneus at that point we will fully release him from the injury.  He verbalized understanding plan of care will contact the office in the meantime for any worsening symptoms                    This document has been electronically signed by JANELL Davies, ONP-C on February 3, 2023 07:51 CST

## 2023-02-03 PROBLEM — S92.014A CLOSED NONDISPLACED FRACTURE OF BODY OF RIGHT CALCANEUS: Status: ACTIVE | Noted: 2023-02-03
